# Patient Record
Sex: FEMALE | Race: ASIAN | Employment: FULL TIME | URBAN - METROPOLITAN AREA
[De-identification: names, ages, dates, MRNs, and addresses within clinical notes are randomized per-mention and may not be internally consistent; named-entity substitution may affect disease eponyms.]

---

## 2019-11-07 ENCOUNTER — OFFICE VISIT (OUTPATIENT)
Dept: OBGYN CLINIC | Age: 29
End: 2019-11-07

## 2019-11-07 VITALS
BODY MASS INDEX: 21.2 KG/M2 | HEIGHT: 58 IN | WEIGHT: 101 LBS | SYSTOLIC BLOOD PRESSURE: 98 MMHG | DIASTOLIC BLOOD PRESSURE: 54 MMHG

## 2019-11-07 DIAGNOSIS — Z01.419 WELL FEMALE EXAM WITH ROUTINE GYNECOLOGICAL EXAM: Primary | ICD-10-CM

## 2019-11-07 NOTE — PATIENT INSTRUCTIONS

## 2019-11-07 NOTE — PROGRESS NOTES
Annual exam ages 21-44    VI Desirae Lewis is a No obstetric history on file. ,  26 y.o. female   Patient's last menstrual period was 10/22/2019 (exact date). She presents for her annual checkup. She is having no significant problems. Has been trying for pregnancy x 4 months. Menstrual status:    Her periods are normal in flow. She is using three to ten pads or tampons per day, usually regular with a 26-32 day interval with 3-7 day duration. She does not have dysmenorrhea. She reports no premenstrual symptoms. Contraception:    The current method of family planning is none. Sexual history:    She  reports that she currently engages in sexual activity and has had partner(s) who are Male. She reports using the following method of birth control/protection: None. Medical conditions: Today is her first GYN visit. Surgical history confirmed with patient. has no past surgical history on file. Pap and Mammogram History:  She has never had a pap smear. The patient has never had a mammogram.    Breast Cancer History/Substance Abuse: negative    History reviewed. No pertinent past medical history. History reviewed. No pertinent surgical history. Allergies: Patient has no known allergies. Tobacco History:  reports that she has never smoked. She has never used smokeless tobacco.  Alcohol Abuse:  reports that she does not drink alcohol. Drug Abuse:  reports that she does not use drugs.     Family Medical/Cancer History:   Family History   Problem Relation Age of Onset    No Known Problems Father         Review of Systems - History obtained from the patient  Constitutional: negative for weight loss, fever, night sweats  HEENT: negative for hearing loss, earache, congestion, snoring, sorethroat  CV: negative for chest pain, palpitations, edema  Resp: negative for cough, shortness of breath, wheezing  GI: negative for change in bowel habits, abdominal pain, black or bloody stools  : negative for frequency, dysuria, hematuria, vaginal discharge  MSK: negative for back pain, joint pain, muscle pain  Breast: negative for breast lumps, nipple discharge, galactorrhea  Skin :negative for itching, rash, hives  Neuro: negative for dizziness, headache, confusion, weakness  Psych: negative for anxiety, depression, change in mood  Heme/lymph: negative for bleeding, bruising, pallor    Physical Exam    Visit Vitals  BP 98/54   Ht 4' 10\" (1.473 m)   Wt 101 lb (45.8 kg)   LMP 10/22/2019 (Exact Date)   BMI 21.11 kg/m²       Constitutional  · Appearance: well-nourished, well developed, alert, in no acute distress    HENT  · Head and Face: appears normal    Neck  · Inspection/Palpation: normal appearance, no masses or tenderness  · Lymph Nodes: no lymphadenopathy present  · Thyroid: gland size normal, nontender, no nodules or masses present on palpation    Chest  · Respiratory Effort: breathing unlabored    Breasts  · Inspection of Breasts: breasts symmetrical, no skin changes, no discharge present, nipple appearance normal, no skin retraction present  · Palpation of Breasts and Axillae: no masses present on palpation, no breast tenderness  · Axillary Lymph Nodes: no lymphadenopathy present    Gastrointestinal  · Abdominal Examination: abdomen non-tender to palpation, normal bowel sounds, no masses present  · Liver and spleen: no hepatomegaly present, spleen not palpable  · Hernias: no hernias identified    Genitourinary  · External Genitalia: normal appearance for age, no discharge present, no tenderness present, no inflammatory lesions present, no masses present, no atrophy present  · Vagina: normal vaginal vault without central or paravaginal defects, no discharge present, no inflammatory lesions present, no masses present  · Bladder: non-tender to palpation  · Urethra: appears normal  · Cervix: normal   · Uterus: normal size, shape and consistency  · Adnexa: no adnexal tenderness present, no adnexal masses present  · Perineum: perineum within normal limits, no evidence of trauma, no rashes or skin lesions present  · Anus: anus within normal limits, no hemorrhoids present  · Inguinal Lymph Nodes: no lymphadenopathy present    Skin  · General Inspection: no rash, no lesions identified    Neurologic/Psychiatric  · Mental Status:  · Orientation: grossly oriented to person, place and time  · Mood and Affect: mood normal, affect appropriate    . Assessment:  Routine gynecologic examination  Her current medical status is satisfactory with no evidence of significant gynecologic issues.     Plan:  Counseled re: diet, exercise, healthy lifestyle  Return for yearly wellness visits  Pt counseled regarding co-testing for high risk HPV with pap  Rec screening mammo at either 34 or 39

## 2019-11-12 LAB
CYTOLOGIST CVX/VAG CYTO: NORMAL
CYTOLOGY CVX/VAG DOC CYTO: NORMAL
CYTOLOGY CVX/VAG DOC THIN PREP: NORMAL
DX ICD CODE: NORMAL
HPV I/H RISK 1 DNA CVX QL PROBE+SIG AMP: NEGATIVE
Lab: NORMAL
OTHER STN SPEC: NORMAL
STAT OF ADQ CVX/VAG CYTO-IMP: NORMAL

## 2021-07-09 ENCOUNTER — OFFICE VISIT (OUTPATIENT)
Dept: OBGYN CLINIC | Age: 31
End: 2021-07-09
Payer: COMMERCIAL

## 2021-07-09 VITALS — BODY MASS INDEX: 20.69 KG/M2 | SYSTOLIC BLOOD PRESSURE: 117 MMHG | WEIGHT: 99 LBS | DIASTOLIC BLOOD PRESSURE: 76 MMHG

## 2021-07-09 DIAGNOSIS — Z31.9 INFERTILITY MANAGEMENT: Primary | ICD-10-CM

## 2021-07-09 PROCEDURE — 99203 OFFICE O/P NEW LOW 30 MIN: CPT | Performed by: OBSTETRICS & GYNECOLOGY

## 2021-07-09 NOTE — PATIENT INSTRUCTIONS
Infertility: Care Instructions  Your Care Instructions     Infertility means that you haven't been able to get pregnant after trying for at least 1 year. It doesn't mean you'll never get pregnant. A woman's chances of getting pregnant are higher when she's younger. A woman is most able to get pregnant (fertile) in her late 25s. Then, in her mid-30s, she becomes less fertile. This is because her eggs get older. Trouble getting pregnant can be caused by a problem with the reproductive organs of a woman, a man, or both. Talk with your doctor about testing and treatment. If you have tests, you will likely start with a hormone test. This is a test for both of you. And then the man will probably have a semen test.  There is a wide range of treatment options. They include medicines, surgery, insemination, and in vitro fertilization (IVF). Follow-up care is a key part of your treatment and safety. Be sure to make and go to all appointments, and call your doctor if you are having problems. It's also a good idea to know your test results and keep a list of the medicines you take. How can you care for yourself at home? For women  · Take a multivitamin with folic acid. This helps to prevent birth defects if you do become pregnant. · Get regular exercise. But do not overdo it. Really hard and long exercise can cause you to release an egg less often. · Eat healthy foods. And drink lots of water. · Stay at a healthy weight. This will increase your chances of getting pregnant. Women who weigh too much or too little can be less fertile. · Talk to your doctor about all medicines you are taking or may take. This includes over-the-counter and prescribed medicines and herbal remedies. Some medicines interfere with pregnancy. · Write down when your period starts and stops for a few months. Bring that information to your doctor.  Your doctor can help you figure out when you ovulate and are most likely to get pregnant if you have sex. Or you may prefer to use a home ovulation test.  For men  · Avoid hot tubs and saunas. · If you get sick and have a fever, try to control your fever. A high fever may reduce your sperm count for months. · If you exercise very hard most days of the week, reduce how much exercise you do. Hard, long exercise may lower your sperm count. · Eat a healthy diet and stay at a healthy weight. Limit alcohol to 2 drinks a day. For both men and women  · If the woman knows when she will ovulate, try to have sex once a day for the 4 days before ovulation and on the day of ovulation. If the man has a low sperm count, have sex every other day. · If the woman does not know when she will ovulate, have sex 2 or 3 times each week. · Don't use lubricants during sex. They may affect how well sperm can travel to meet the woman's egg. · Avoid smoking, marijuana, and illegal drugs. When should you call for help? Watch closely for changes in your health, and be sure to contact your doctor if you have any problems. Where can you learn more? Go to http://www.gray.com/  Enter C591 in the search box to learn more about \"Infertility: Care Instructions. \"  Current as of: October 8, 2020               Content Version: 12.8  © 2006-2021 [employer]. Care instructions adapted under license by [employer] (which disclaims liability or warranty for this information). If you have questions about a medical condition or this instruction, always ask your healthcare professional. [place] [address] any warranty or liability for your use of this information.

## 2021-07-09 NOTE — PROGRESS NOTES
Infertility Evaluation     VI Natanael Arenas is a No obstetric history on file. ,  31 y.o. female  Patient's last menstrual period was 06/17/2021 (exact date). who presents with had concerns including Infertility. .    She and her partner have been attempting pregnancy for 2 years. Her menses occur monthly and she has moliminal symptoms. (breast tenderness, mood changes, bloating, acne). She has not done ovulation testing at home. Her cycles do seem to be ovulatory. The patient denies risk factors for tubal cause of infertility. Her partner has the following risk factors for male infertility: none    Previous evaluation:She has not had a previous evaluation for infertility consisting of ovulation kits, basal body temperature charts, hysterosalpingogram, semen analysis. She has not consulted in the past with an infertility specialist.  She has not been treated previously. She has questions regarding: the etiology and treatment of (anovulatory, tubal factor, male factor, primary, and secondary) infertility. No past medical history on file. No past surgical history on file.   Social History     Occupational History    Not on file   Tobacco Use    Smoking status: Never Smoker    Smokeless tobacco: Never Used   Substance and Sexual Activity    Alcohol use: Never    Drug use: Never    Sexual activity: Yes     Partners: Male     Birth control/protection: None     Family History   Problem Relation Age of Onset    No Known Problems Father        No Known Allergies  Prior to Admission medications    Not on File        Review of Systems: History obtained from the patient  Constitutional: negative for weight loss, fever, night sweats  Breast: negative for breast lumps, nipple discharge, galactorrhea  GI: negative for change in bowel habits, abdominal pain, black or bloody stools  : negative for frequency, dysuria, hematuria, vaginal discharge  MSK: negative for back pain, joint pain, muscle pain  Skin: negative for itching, rash, hives  Psych: negative for anxiety, depression, change in mood    Objective:    Visit Vitals  /76   Wt 99 lb (44.9 kg)   LMP 06/17/2021 (Exact Date)   BMI 20.69 kg/m²       Physical Exam:     Constitutional  · Appearance: well-nourished, well developed, alert, in no acute distress    HENT  · Head and Face: appears normal    Neck  · Inspection/Palpation: normal appearance, no masses or tenderness  · Lymph Nodes: no lymphadenopathy present  · Thyroid: gland size normal, nontender, no nodules or masses present on palpation    Gastrointestinal  · Abdominal Examination: abdomen non-tender to palpation, normal bowel sounds, no masses present  · Liver and spleen: no hepatomegaly present, spleen not palpable  · Hernias: no hernias identified    Skin  · General Inspection: no rash, no lesions identified    Neurologic/Psychiatric  · Mental Status:  · Orientation: grossly oriented to person, place and time  · Mood and Affect: mood normal, affect appropriate    Assessment/Plan:  Infertility of uncertain origin, will check 21 day progesterone today, pcos labs, SA, and schedule HSG.

## 2021-07-10 LAB
ALBUMIN SERPL-MCNC: 4.1 G/DL (ref 3.5–5)
ALBUMIN/GLOB SERPL: 1.2 {RATIO} (ref 1.1–2.2)
ALP SERPL-CCNC: 46 U/L (ref 45–117)
ALT SERPL-CCNC: 23 U/L (ref 12–78)
ANION GAP SERPL CALC-SCNC: 5 MMOL/L (ref 5–15)
AST SERPL-CCNC: 17 U/L (ref 15–37)
BILIRUB SERPL-MCNC: 0.4 MG/DL (ref 0.2–1)
BUN SERPL-MCNC: 10 MG/DL (ref 6–20)
BUN/CREAT SERPL: 20 (ref 12–20)
CALCIUM SERPL-MCNC: 9.7 MG/DL (ref 8.5–10.1)
CHLORIDE SERPL-SCNC: 104 MMOL/L (ref 97–108)
CO2 SERPL-SCNC: 29 MMOL/L (ref 21–32)
CREAT SERPL-MCNC: 0.49 MG/DL (ref 0.55–1.02)
GLOBULIN SER CALC-MCNC: 3.5 G/DL (ref 2–4)
GLUCOSE SERPL-MCNC: 66 MG/DL (ref 65–100)
POTASSIUM SERPL-SCNC: 4.5 MMOL/L (ref 3.5–5.1)
PROLACTIN SERPL-MCNC: 10.5 NG/ML
PROT SERPL-MCNC: 7.6 G/DL (ref 6.4–8.2)
SODIUM SERPL-SCNC: 138 MMOL/L (ref 136–145)
TSH SERPL DL<=0.05 MIU/L-ACNC: 1.75 UIU/ML (ref 0.36–3.74)

## 2021-07-11 LAB — PROGEST SERPL-MCNC: 4.9 NG/ML

## 2021-07-14 LAB — 17OHP SERPL-MCNC: 171 NG/DL

## 2021-07-16 LAB
COMMENT, TESC2: NORMAL
TESTOST FREE SERPL-MCNC: 2.2 PG/ML (ref 0–4.2)
TESTOST SERPL-MCNC: 15 NG/DL (ref 8–60)

## 2021-07-30 DIAGNOSIS — Z31.41 FERTILITY TESTING: Primary | ICD-10-CM

## 2021-09-22 ENCOUNTER — HOSPITAL ENCOUNTER (OUTPATIENT)
Dept: GENERAL RADIOLOGY | Age: 31
Discharge: HOME | End: 2021-09-22
Attending: OBSTETRICS & GYNECOLOGY
Payer: COMMERCIAL

## 2021-09-22 DIAGNOSIS — Z31.41 FERTILITY TESTING: ICD-10-CM

## 2021-09-22 PROCEDURE — 77030012242 HC CATH HYSTSAL STER -B

## 2021-09-22 PROCEDURE — 74011000636 HC RX REV CODE- 636: Performed by: RADIOLOGY

## 2021-09-22 PROCEDURE — 58340 CATHETER FOR HYSTEROGRAPHY: CPT

## 2021-09-22 RX ADMIN — IOHEXOL 20 ML: 350 INJECTION, SOLUTION INTRAVENOUS at 13:47

## 2022-01-26 ENCOUNTER — OFFICE VISIT (OUTPATIENT)
Dept: OBGYN CLINIC | Age: 32
End: 2022-01-26
Payer: COMMERCIAL

## 2022-01-26 VITALS — SYSTOLIC BLOOD PRESSURE: 101 MMHG | DIASTOLIC BLOOD PRESSURE: 65 MMHG | HEART RATE: 92 BPM

## 2022-01-26 DIAGNOSIS — Z31.69 INFERTILITY COUNSELING: Primary | ICD-10-CM

## 2022-01-26 DIAGNOSIS — Z30.09 FAMILY PLANNING: ICD-10-CM

## 2022-01-26 PROCEDURE — 99213 OFFICE O/P EST LOW 20 MIN: CPT | Performed by: OBSTETRICS & GYNECOLOGY

## 2022-01-26 RX ORDER — LETROZOLE 2.5 MG/1
2.5 TABLET, FILM COATED ORAL DAILY
Qty: 5 TABLET | Refills: 0 | Status: SHIPPED | OUTPATIENT
Start: 2022-01-26 | End: 2022-03-02

## 2022-01-26 NOTE — PROGRESS NOTES
[facility]  [email or web address]  [phone]    Nicole Hawley MD, FACOG       OB/GYN Problem visit    Chief Complaint:   Chief Complaint   Patient presents with    Advice Only     Fertility        Last or next WWE is: 11/07/2019    History of Present Illness: This is a new problem being evaluated by this provider. The patient is a 27 y.o. No obstetric history on file. female who reports having a difficult time trying to get pregnant. Her and her partner have been trying  for 3 years. She reports that she has had x-rays to show that her fallopian tube is open. She also reports abdomen pain 2 days before every period. She reports that there is endometrium on the outside of her uterus. The partners semen analysis was normal. She reports her periods are pretty normal.  She reports the symptoms are is unchanged. Aggravating factors include none. Alleviating factors include none. She does not have other concerns. LMP: Patient's last menstrual period was 01/05/2022 (exact date). PFSH:  History reviewed. No pertinent past medical history. History reviewed. No pertinent surgical history. Family History   Problem Relation Age of Onset    No Known Problems Father      Social History     Tobacco Use    Smoking status: Never Smoker    Smokeless tobacco: Never Used   Substance Use Topics    Alcohol use: Never    Drug use: Never     No Known Allergies  No current outpatient medications on file. No current facility-administered medications for this visit.        Review of Systems:  History obtained from the patient  Constitutional: negative for fevers, chills and weight loss  ENT ROS: negative for - hearing change, oral lesions or visual changes  Respiratory: negative for cough, wheezing or dyspnea on exertion  Cardiovascular: negative for chest pain, irregular heart beats, exertional chest pressure/discomfort  Gastrointestinal: negative for dysphagia, nausea and vomiting  Genito-Urinary ROS:  see HPI  Inteument/breast: negative for rash, breast lump and nipple discharge  Musculoskeletal:negative for stiff joints, neck pain and muscle weakness  Endocrine ROS: negative for - breast changes, galactorrhea or temperature intolerance  Hematological and Lymphatic ROS: negative for - blood clots, bruising or swollen lymph nodes    Physical Exam:  Visit Vitals  /65 (BP 1 Location: Right arm, BP Patient Position: Sitting, BP Cuff Size: Adult)   Pulse 92       GENERAL: alert, well appearing, and in no distress  HEAD: normocephalic, atraumatic. NEURO: alert, oriented, normal speech    Assessment:  Encounter Diagnoses   Name Primary?  Family planning Yes    Infertility counseling        Plan:  The patient is advised that she should contact the office if she does not note improvement or if symptoms recur  Recommend follow up with PCP for non-gynecologic complaints and chronic medical problems. She should contact our office with any questions or concerns  She could keep her routine annual exam appointment. Reviewed partners SA (low morphology)  Reviewed labs/ HSG  Disc options and txt for infertility  Disc oral txt +- IUI and risks of femara: side effects/intolerance/multiples  Pt would like to try  Femara day 3-7  Progesterone lab day 20-21  UPT day 31  AE due  Disc inc chance of pregnancy if adds IUI, patient defers for now. On this date, 1/26/2022,  I have spent 22 minutes reviewing previous notes, test results and face to face with the patient discussing the diagnosis and importance of compliance with the treatment plan as well as documenting on the day of the visit. No orders of the defined types were placed in this encounter. No results found for this visit on 01/26/22.

## 2022-01-26 NOTE — PATIENT INSTRUCTIONS
Infertility: Care Instructions  Overview     Infertility means that you haven't been able to get pregnant after trying for at least 1 year (or 6 months if you're over 35). It doesn't mean you'll never get pregnant. Your chances of getting pregnant are higher when you are younger. You are most able to get pregnant (fertile) in your late 25s. Then, in your mid-30s, you become less fertile. This is because your eggs get older. Trouble getting pregnant can be caused by a problem with the reproductive organs. Talk with your doctor about testing and treatment. Testing will likely start with hormone and semen testing. There is a wide range of treatment options. They include medicines, surgery, insemination, and in vitro fertilization (IVF). Follow-up care is a key part of your treatment and safety. Be sure to make and go to all appointments, and call your doctor if you are having problems. It's also a good idea to know your test results and keep a list of the medicines you take. How can you care for yourself at home? · Take a multivitamin with folic acid. This helps to prevent birth defects if you do become pregnant. · Get regular exercise. But do not overdo it. Excessive exercise can cause eggs to release less often or sperm count to lower. · Avoid excessive alcohol and caffeine use. · Avoid smoking, marijuana, and illegal drugs. · Stay at a healthy weight. This will increase your chances of getting pregnant. Being overweight or weighing too little can affect fertility. · Talk to your doctor about all medicines you are taking or may take. This includes over-the-counter and prescribed medicines and herbal remedies. Some medicines interfere with pregnancy. · Write down when your period starts and stops for a few months. Bring that information to your doctor. Your doctor can help you figure out when you ovulate and are most likely to get pregnant if you have sex.  Or you may prefer to use a home ovulation test.  · If you know when you will ovulate, try to have sex once a day for the 4 days before ovulation and on the day of ovulation. · If you don't know when you will ovulate, have sex 2 or 3 times each week. · Don't use lubricants during sex. They may affect how well sperm can travel to meet an egg. When should you call for help? Watch closely for changes in your health, and be sure to contact your doctor if you have any problems. Where can you learn more? Go to http://www.gray.com/  Enter C591 in the search box to learn more about \"Infertility: Care Instructions. \"  Current as of: June 16, 2021               Content Version: 13.0  © 2006-2021 [employer]. Care instructions adapted under license by [employer] (which disclaims liability or warranty for this information). If you have questions about a medical condition or this instruction, always ask your healthcare professional. [address] any warranty or liability for your use of this information.

## 2022-02-21 ENCOUNTER — LAB ONLY (OUTPATIENT)
Dept: OBGYN CLINIC | Age: 32
End: 2022-02-21

## 2022-02-21 DIAGNOSIS — Z31.9 INFERTILITY MANAGEMENT: ICD-10-CM

## 2022-02-21 DIAGNOSIS — Z30.09 FAMILY PLANNING: Primary | ICD-10-CM

## 2022-02-23 LAB — PROGEST SERPL-MCNC: 31.4 NG/ML

## 2022-03-01 ENCOUNTER — TELEPHONE (OUTPATIENT)
Dept: OBGYN CLINIC | Age: 32
End: 2022-03-01

## 2022-03-01 NOTE — TELEPHONE ENCOUNTER
Good news, your progesterone level was consistent with ovulation. Please check a urine pregnancy test with a first morning urine sample on day 31 of your cycle. If it is positive, great! If it is negative, we can re-attempt another cycle with femara (same dose) or add IUI/insemination with a fertility specialist consult. Contact the office if your UPT is negative and you want to repeat the clomid and we can send a refill. Please take a daily prenatal vitamin, eat a healthy balanced diet and exercise at least 30 minutes per day. Tani Greco MD    Current Outpatient Medications   Medication Instructions    letrozole [facility]) 2.5 mg, Oral, DAILY, Take day 3-7 of cycle.

## 2022-03-01 NOTE — TELEPHONE ENCOUNTER
Call received at 127PM    27year old patient last seen in the office on 1/26/2022    HIPPA verified to speak to  regarding patient     calling to check on recent lab work     Please review and advise    This nurse did not discuss with patient at this time     Aniceto Lara MD   2/23/2022  3:39 PM EST         Results normal, notify pt of results, her progesterone level is high which normal indicates that she is ovulating.  Hers is very high which could indicate an early pregnancy.  I would recommend she check a upt (with the first urin eof the morning) or rto for quant hcg.          Please advise

## 2022-03-02 RX ORDER — LETROZOLE 2.5 MG/1
2.5 TABLET, FILM COATED ORAL DAILY
Qty: 5 TABLET | Refills: 0 | Status: SHIPPED | OUTPATIENT
Start: 2022-03-02 | End: 2022-03-27 | Stop reason: SDUPTHER

## 2022-03-02 NOTE — TELEPHONE ENCOUNTER
KAM verified to speak to  regarding his wife   reports patients cycle started last night       ?  Ok to refill femara    Rx pended for amend/sign      Please advise    Thank you

## 2022-03-02 NOTE — TELEPHONE ENCOUNTER
HiPPA verified to speak to      advised of MD sent prescription by MD to preferred pharmacy    Patient verbalized understanding.

## 2022-03-25 ENCOUNTER — TELEPHONE (OUTPATIENT)
Dept: OBGYN CLINIC | Age: 32
End: 2022-03-25

## 2022-03-25 NOTE — TELEPHONE ENCOUNTER
Call received at 2:30Pm      KAM verified to speak to  regarding his wife    27year old patient last seen in the office on 1/26/2022     calling to get a refill of the letrozole [facility]) 2.5 mg tablet     Stating that her cycle is due 3/31/2022 and this will be the third round of the letrozole      Please advise    Pharmacy confirmed    Thank you

## 2022-03-27 RX ORDER — LETROZOLE 2.5 MG/1
2.5 TABLET, FILM COATED ORAL DAILY
Qty: 5 TABLET | Refills: 0 | Status: SHIPPED | OUTPATIENT
Start: 2022-03-27

## 2022-03-27 NOTE — TELEPHONE ENCOUNTER
OK refill sent. But if not successful this round, rec consultation with [facility] to discuss other options, like adding IUI (or can contact them for this next cycle) to increase chance of success.         Anibal Gurrola MD

## 2022-03-28 NOTE — TELEPHONE ENCOUNTER
HiPPA verified to speak to .  advised of MD recommendations and prescription sent by Md     will call back to set up appointment for annual exam after checking with patient regarding her schedule     verbalized understanding.

## 2023-05-19 ENCOUNTER — OFFICE VISIT (OUTPATIENT)
Age: 33
End: 2023-05-19

## 2023-05-19 VITALS — SYSTOLIC BLOOD PRESSURE: 109 MMHG | WEIGHT: 105 LBS | DIASTOLIC BLOOD PRESSURE: 75 MMHG | BODY MASS INDEX: 21.95 KG/M2

## 2023-05-19 DIAGNOSIS — Z01.419 ENCOUNTER FOR GYNECOLOGICAL EXAMINATION (GENERAL) (ROUTINE) WITHOUT ABNORMAL FINDINGS: Primary | ICD-10-CM

## 2023-05-19 DIAGNOSIS — N89.8 VAGINAL DISCHARGE: ICD-10-CM

## 2023-05-19 DIAGNOSIS — Z87.440 PERSONAL HISTORY UTI: ICD-10-CM

## 2023-05-19 DIAGNOSIS — N89.8 VAGINAL ODOR: ICD-10-CM

## 2023-05-19 DIAGNOSIS — R35.0 URINARY FREQUENCY: ICD-10-CM

## 2023-05-19 NOTE — PROGRESS NOTES
[facility]  [email or web address]  [phone]    Rosa Orosco MD, [address]        Annual Gynecologic Exam:  Chief Complaint   Patient presents with    Annual Exam       VI Rena Harrington is a No obstetric history on file. ,  34 y.o. female   Patient's last menstrual period was 04/27/2023. She presents for her annual checkup. She is having problems - urinary sx and vaginitis sx. Per Rooming Note:    Patient's last menstrual period was 04/27/2023. Her periods are moderate in flow and usually regular with a 26-32 day interval with 3-7 day duration. She does not have dysmenorrhea. Problems: problems - urinary frequency treated one month ago by patient first but still having problem; also states having vaginal odor, patient stated she notices the odor more after her menstrual cycle and intercourse  Birth Control: none. Last Pap: normal obtained 4 year(s) ago. She does not have a history of FLORIDALMA 2, 3 or cervical cancer. With regard to the Gardisil vaccine, she has not received it yet  Sexual history and Contraception:    Social History     Substance and Sexual Activity   Sexual Activity Not on file      History reviewed. No pertinent past medical history. No current outpatient medications on file. No current facility-administered medications for this visit. OB History   No obstetric history on file. History reviewed. No pertinent surgical history.   Family History   Problem Relation Age of Onset    No Known Problems Father      Social History     Socioeconomic History    Marital status:      Spouse name: Not on file    Number of children: Not on file    Years of education: Not on file    Highest education level: Not on file   Occupational History    Not on file   Tobacco Use    Smoking status: Never    Smokeless tobacco: Never   Substance and Sexual Activity    Alcohol use: Never    Drug use: Never    Sexual activity: Not on file   Other Topics Concern    Not on

## 2023-05-19 NOTE — PROGRESS NOTES
Kennedi Mitchell is a 34 y.o. female returns for an annual exam     Chief Complaint   Patient presents with    Annual Exam       Patient's last menstrual period was 04/27/2023. Her periods are moderate in flow and usually regular with a 26-32 day interval with 3-7 day duration. She does not have dysmenorrhea. Problems: problems - urinary frequency treated one month ago by patient first but still having problem; also states having vaginal odor, patient stated she notices the odor more after her menstrual cycle and intercourse  Birth Control: none. Last Pap: normal obtained 4 year(s) ago. She does not have a history of FLORIDALMA 2, 3 or cervical cancer. With regard to the Gardisil vaccine, she has not received it yet        Examination chaperoned by Sofía Peck LPN.

## 2023-05-22 LAB — SPECIMEN STATUS REPORT: NORMAL

## 2023-05-23 LAB
A VAGINAE DNA VAG QL NAA+PROBE: NORMAL SCORE
BVAB2 DNA VAG QL NAA+PROBE: NORMAL SCORE
C ALBICANS DNA VAG QL NAA+PROBE: NEGATIVE
C GLABRATA DNA VAG QL NAA+PROBE: NEGATIVE
MEGA1 DNA VAG QL NAA+PROBE: NORMAL SCORE
T VAGINALIS DNA VAG QL NAA+PROBE: NEGATIVE

## 2023-05-25 RX ORDER — LETROZOLE 2.5 MG/1
2.5 TABLET, FILM COATED ORAL DAILY
COMMUNITY
Start: 2022-03-27

## 2023-05-31 LAB
CYTOLOGIST CVX/VAG CYTO: NORMAL
CYTOLOGY CVX/VAG DOC CYTO: NORMAL
CYTOLOGY CVX/VAG DOC THIN PREP: NORMAL
DX ICD CODE: NORMAL
HPV GENOTYPE REFLEX: NORMAL
HPV I/H RISK 4 DNA CVX QL PROBE+SIG AMP: NEGATIVE
Lab: NORMAL
Lab: NORMAL
OTHER STN SPEC: NORMAL
STAT OF ADQ CVX/VAG CYTO-IMP: NORMAL

## 2023-06-05 ENCOUNTER — TELEPHONE (OUTPATIENT)
Age: 33
End: 2023-06-05

## 2023-06-05 NOTE — TELEPHONE ENCOUNTER
Two identifiers used     HIPPA verified to speak to     34year old patient last seen in the office on 5/19/2023         calling to say that on her last cycle for the first two days she had cramping that was at 8-9 on the pain scale of 1-10      Currently scheduled to be seen 9/6/2023 with ultrasound       transferred to triage nurse to get earlier appointments     Please advise    Thank you

## 2023-06-08 NOTE — TELEPHONE ENCOUNTER
This nurse called and spoke to  ( on [facility]) and advised of my chart message sent on 6/6/2023 and he will check with patient and respond     verbalized understanding.

## 2023-06-09 NOTE — TELEPHONE ENCOUNTER
Pt messaged on [facility] & was offered another visit to line up with the week after her cycle

## 2023-07-31 ENCOUNTER — OFFICE VISIT (OUTPATIENT)
Age: 33
End: 2023-07-31
Payer: COMMERCIAL

## 2023-07-31 VITALS
SYSTOLIC BLOOD PRESSURE: 102 MMHG | HEIGHT: 58 IN | BODY MASS INDEX: 21.79 KG/M2 | WEIGHT: 103.8 LBS | DIASTOLIC BLOOD PRESSURE: 76 MMHG

## 2023-07-31 DIAGNOSIS — Z11.3 VENEREAL DISEASE SCREENING: ICD-10-CM

## 2023-07-31 DIAGNOSIS — N94.6 DYSMENORRHEA: ICD-10-CM

## 2023-07-31 DIAGNOSIS — R31.9 HEMATURIA, UNSPECIFIED TYPE: ICD-10-CM

## 2023-07-31 DIAGNOSIS — N89.8 VAGINAL ODOR: ICD-10-CM

## 2023-07-31 DIAGNOSIS — R10.32 LLQ PAIN: Primary | ICD-10-CM

## 2023-07-31 DIAGNOSIS — R35.0 URINARY FREQUENCY: ICD-10-CM

## 2023-07-31 LAB
BILIRUBIN, URINE, POC: NEGATIVE
BLOOD URINE, POC: ABNORMAL
GLUCOSE URINE, POC: NEGATIVE
KETONES, URINE, POC: NEGATIVE
LEUKOCYTE ESTERASE, URINE, POC: NEGATIVE
NITRITE, URINE, POC: NEGATIVE
PH, URINE, POC: 5 (ref 4.6–8)
PROTEIN,URINE, POC: NEGATIVE
SPECIFIC GRAVITY, URINE, POC: 1.02 (ref 1–1.03)
URINALYSIS CLARITY, POC: CLEAR
URINALYSIS COLOR, POC: ABNORMAL
UROBILINOGEN, POC: NORMAL

## 2023-07-31 PROCEDURE — 81003 URINALYSIS AUTO W/O SCOPE: CPT | Performed by: OBSTETRICS & GYNECOLOGY

## 2023-07-31 PROCEDURE — 99213 OFFICE O/P EST LOW 20 MIN: CPT | Performed by: OBSTETRICS & GYNECOLOGY

## 2023-08-01 LAB
BACTERIA SPEC CULT: NORMAL
SERVICE CMNT-IMP: NORMAL

## 2023-08-02 NOTE — RESULT ENCOUNTER NOTE
No evidence of UTI. Please update chart/history/prenatal labs, if needed. [facility] message sent if active.